# Patient Record
Sex: FEMALE | Race: WHITE
[De-identification: names, ages, dates, MRNs, and addresses within clinical notes are randomized per-mention and may not be internally consistent; named-entity substitution may affect disease eponyms.]

---

## 2019-01-13 ENCOUNTER — HOSPITAL ENCOUNTER (EMERGENCY)
Dept: HOSPITAL 63 - ER | Age: 49
Discharge: ANOTHER HOSPITAL | End: 2019-01-13
Payer: COMMERCIAL

## 2019-01-13 VITALS — HEIGHT: 62 IN | BODY MASS INDEX: 53.92 KG/M2 | WEIGHT: 293 LBS

## 2019-01-13 VITALS — SYSTOLIC BLOOD PRESSURE: 104 MMHG | DIASTOLIC BLOOD PRESSURE: 71 MMHG

## 2019-01-13 DIAGNOSIS — G89.11: ICD-10-CM
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
## 2019-01-13 DIAGNOSIS — R07.89: Primary | ICD-10-CM

## 2019-01-13 DIAGNOSIS — K21.9: ICD-10-CM
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
## 2019-01-13 DIAGNOSIS — G43.909: ICD-10-CM

## 2019-01-13 DIAGNOSIS — J45.909: ICD-10-CM

## 2019-01-13 DIAGNOSIS — R79.1: ICD-10-CM

## 2019-01-13 DIAGNOSIS — F41.9: ICD-10-CM
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
## 2019-01-13 DIAGNOSIS — E66.01: ICD-10-CM

## 2019-01-13 DIAGNOSIS — Z86.2: ICD-10-CM

## 2019-01-13 DIAGNOSIS — M19.90: ICD-10-CM
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
## 2019-01-13 DIAGNOSIS — M54.6: ICD-10-CM

## 2019-01-13 DIAGNOSIS — E46: ICD-10-CM

## 2019-01-13 LAB
ALBUMIN SERPL-MCNC: 3.1 G/DL (ref 3.4–5)
ALP SERPL-CCNC: 98 U/L (ref 46–116)
ALT SERPL-CCNC: 35 U/L (ref 14–59)
AMPHETAMINE/METHAMPHETAMINE: (no result)
ANION GAP SERPL CALC-SCNC: 10 MMOL/L (ref 6–14)
APTT PPP: YELLOW S
AST SERPL-CCNC: 32 U/L (ref 15–37)
BACTERIA #/AREA URNS HPF: 0 /HPF
BARBITURATES UR-MCNC: (no result) UG/ML
BASOPHILS # BLD AUTO: 0 X10^3/UL (ref 0–0.2)
BASOPHILS NFR BLD: 0 % (ref 0–3)
BENZODIAZ UR-MCNC: (no result) UG/L
BILIRUB DIRECT SERPL-MCNC: 0.4 MG/DL (ref 0–0.2)
BILIRUB SERPL-MCNC: 0.8 MG/DL (ref 0.2–1)
BILIRUB UR QL STRIP: (no result)
CA-I SERPL ISE-MCNC: 11 MG/DL (ref 7–20)
CALCIUM SERPL-MCNC: 8.9 MG/DL (ref 8.5–10.1)
CANNABINOIDS UR-MCNC: (no result) UG/L
CHLORIDE SERPL-SCNC: 95 MMOL/L (ref 98–107)
CO2 SERPL-SCNC: 27 MMOL/L (ref 21–32)
COCAINE UR-MCNC: (no result) NG/ML
CREAT SERPL-MCNC: 1 MG/DL (ref 0.6–1)
EOSINOPHIL NFR BLD: 0.1 X10^3/UL (ref 0–0.7)
EOSINOPHIL NFR BLD: 1 % (ref 0–3)
ERYTHROCYTE [DISTWIDTH] IN BLOOD BY AUTOMATED COUNT: 13.7 % (ref 11.5–14.5)
FIBRINOGEN PPP-MCNC: CLEAR MG/DL
GFR SERPLBLD BASED ON 1.73 SQ M-ARVRAT: 59.4 ML/MIN
GLUCOSE SERPL-MCNC: 103 MG/DL (ref 70–99)
GLUCOSE UR STRIP-MCNC: (no result) MG/DL
HCT VFR BLD CALC: 37.5 % (ref 36–47)
HGB BLD-MCNC: 12.6 G/DL (ref 12–15.5)
HYALINE CASTS #/AREA URNS LPF: (no result) /HPF
LIPASE: 56 U/L (ref 73–393)
LYMPHOCYTES # BLD: 0.6 X10^3/UL (ref 1–4.8)
LYMPHOCYTES NFR BLD AUTO: 7 % (ref 24–48)
MAGNESIUM SERPL-MCNC: 1.9 MG/DL (ref 1.8–2.4)
MCH RBC QN AUTO: 29 PG (ref 25–35)
MCHC RBC AUTO-ENTMCNC: 34 G/DL (ref 31–37)
MCV RBC AUTO: 87 FL (ref 79–100)
METHADONE SERPL-MCNC: (no result) NG/ML
MONO #: 0.7 X10^3/UL (ref 0–1.1)
MONOCYTES NFR BLD: 8 % (ref 0–9)
NEUT #: 7.1 X10^3UL (ref 1.8–7.7)
NEUTROPHILS NFR BLD AUTO: 84 % (ref 31–73)
NITRITE UR QL STRIP: (no result)
OPIATES UR-MCNC: (no result) NG/ML
PCP SERPL-MCNC: (no result) MG/DL
PLATELET # BLD AUTO: 238 X10^3/UL (ref 140–400)
POTASSIUM SERPL-SCNC: 3.8 MMOL/L (ref 3.5–5.1)
PROT SERPL-MCNC: 7.9 G/DL (ref 6.4–8.2)
RBC # BLD AUTO: 4.33 X10^6/UL (ref 3.5–5.4)
RBC #/AREA URNS HPF: (no result) /HPF (ref 0–2)
SODIUM SERPL-SCNC: 132 MMOL/L (ref 136–145)
SP GR UR STRIP: >=1.03
SQUAMOUS #/AREA URNS LPF: (no result) /LPF
UROBILINOGEN UR-MCNC: 2 MG/DL
WBC # BLD AUTO: 8.5 X10^3/UL (ref 4–11)
WBC #/AREA URNS HPF: (no result) /HPF (ref 0–4)

## 2019-01-13 PROCEDURE — 71045 X-RAY EXAM CHEST 1 VIEW: CPT

## 2019-01-13 PROCEDURE — 82550 ASSAY OF CK (CPK): CPT

## 2019-01-13 PROCEDURE — 85610 PROTHROMBIN TIME: CPT

## 2019-01-13 PROCEDURE — 36415 COLL VENOUS BLD VENIPUNCTURE: CPT

## 2019-01-13 PROCEDURE — 80048 BASIC METABOLIC PNL TOTAL CA: CPT

## 2019-01-13 PROCEDURE — 83690 ASSAY OF LIPASE: CPT

## 2019-01-13 PROCEDURE — 71275 CT ANGIOGRAPHY CHEST: CPT

## 2019-01-13 PROCEDURE — 85730 THROMBOPLASTIN TIME PARTIAL: CPT

## 2019-01-13 PROCEDURE — 80076 HEPATIC FUNCTION PANEL: CPT

## 2019-01-13 PROCEDURE — 84443 ASSAY THYROID STIM HORMONE: CPT

## 2019-01-13 PROCEDURE — 85379 FIBRIN DEGRADATION QUANT: CPT

## 2019-01-13 PROCEDURE — 84484 ASSAY OF TROPONIN QUANT: CPT

## 2019-01-13 PROCEDURE — 80307 DRUG TEST PRSMV CHEM ANLYZR: CPT

## 2019-01-13 PROCEDURE — 96372 THER/PROPH/DIAG INJ SC/IM: CPT

## 2019-01-13 PROCEDURE — 72128 CT CHEST SPINE W/O DYE: CPT

## 2019-01-13 PROCEDURE — 83735 ASSAY OF MAGNESIUM: CPT

## 2019-01-13 PROCEDURE — 99285 EMERGENCY DEPT VISIT HI MDM: CPT

## 2019-01-13 PROCEDURE — 83880 ASSAY OF NATRIURETIC PEPTIDE: CPT

## 2019-01-13 PROCEDURE — 85025 COMPLETE CBC W/AUTO DIFF WBC: CPT

## 2019-01-13 PROCEDURE — 93005 ELECTROCARDIOGRAM TRACING: CPT

## 2019-01-13 PROCEDURE — 81001 URINALYSIS AUTO W/SCOPE: CPT

## 2019-01-13 RX ADMIN — ENOXAPARIN SODIUM ONE MG: 150 INJECTION SUBCUTANEOUS at 22:24

## 2019-01-13 RX ADMIN — MORPHINE SULFATE ONE MG: 10 INJECTION, SOLUTION INTRAMUSCULAR; INTRAVENOUS at 20:57

## 2019-01-13 RX ADMIN — SODIUM CHLORIDE, SODIUM LACTATE, POTASSIUM CHLORIDE, AND CALCIUM CHLORIDE SCH MLS/HR: .6; .31; .03; .02 INJECTION, SOLUTION INTRAVENOUS at 20:57

## 2019-01-13 RX ADMIN — IOHEXOL ONE ML: 350 INJECTION, SOLUTION INTRAVENOUS at 20:28

## 2019-01-13 RX ADMIN — ASPIRIN 81 MG ONE MG: 81 TABLET ORAL at 20:57

## 2019-01-13 NOTE — RAD
CT THORACIC SPINE WO CONTRAST, CT ANGIOGRAPHY CHEST dated 1/13/2019 8:20 

PM

 

Indication:UPPER BACK PAIN- INBETWEEN SHOULDER BLADES, FALL<BR>HX 

COMPRESSION FRACTURE - UNKNOWN LEVEL

 

Comparison: CT chest 9/20/2012

 

Technique: Thin section CT images were performed using an infusion of 100 

mL Omnipaque 350. MIP reconstructions were obtained.

One or more of the following individualized dose reduction techniques were

utilized for this examination:  1. Automated exposure control  2. 

Adjustment of the mA and/or kV according to patient size  3. Use of 

iterative reconstruction technique

 

Findings: 

There is mild atelectasis in the lungs. They otherwise appear clear. The 

central airways show no obstruction. No enlarged lymph nodes are seen. The

thoracic aorta is normal in caliber without evidence of dissection.

 

Evaluation of the pulmonary arterial tree is mildly affected by 

respiratory motion artifact. Contrast opacification is moderate. No 

abnormal filling defect is seen extending to the subsegmental branch 

level.

 

Images through the upper abdomen show some gallbladder distention. No 

other abnormality is seen.

 

CT thoracic spine: Alignment is normal. There is no loss of vertebral body

height or other evidence for fracture. No destructive process is seen. 

There are prominent anterior and lateral osteophytes through the mid 

thoracic levels. Evaluation of the soft tissue components of the canal is 

limited without intrathecal contrast. There is a chronic appearing 

superior endplate defect of L1.   

 

IMPRESSION:

No evidence of pulmonary embolism. Mild atelectasis in the lungs. No other

acute findings.

 

CT thoracic spine shows degenerative changes without apparent acute 

abnormality.

 

Electronically signed by: Quirino Norris Jr., MD (1/13/2019 9:17 PM) 

[facility]

## 2019-01-13 NOTE — ED.ADGEN
Past History


Past Medical History:  Anemia, Anxiety, Arthritis, Asthma, Cancer, Constipation

, GERD, Migraines, Other


Additional Past Medical Histor:  Morbid Obesity  BMI 50. 0 - 59.9,  Hx. 

Atypical CP,  Chest wall pain,


Past Medical History


Compression fx- end plate T 12


Past Surgical History:  No Surgical History


Alcohol Use:  None


Drug Use:  None





Adult General


Chief Complaint


Chief Complaint


".. I fell .. and hurt this Rt shoulder and back...   I was on the way to 

bathroom.. I was using my Dads walker.. I tripped and fell.. I still hurt..I 

took all the left over pain meds that I ve had.... and I still hurt.... ""  ..I 

was recently at [facility]  for two days... they did nothing for me.... ". " I 

fell on Sat.  in the morning.. "





HPI


HPI





Patient is a 48 year old female who presents with above hx and complaints chest 

wall and back pain.  Pt. is somewhat uncooperative  poor historian after taken 

multiple pain meds and muscle relaxers at home prior to arrival.   Pt. recently 

admitted at [facility] for atypical chest and back pain.   Pt. admitted on 1/11/ 2019  and discharged on per her hx today???.  Pt. normally follows with Dr. Potts.   Will attempt to get records from [facility].  Pt. currently reports back 

and chest pain as 20/10.   States any movement is pain.  Pt .  pain appears to 

be Thoracic and Rt. Posterior Chest wall and Shoulder blade.





Review of Systems


Review of Systems





Constitutional: Denies fever or chills []


Eyes: Denies change in visual acuity, redness, or eye pain []


HENT: Denies nasal congestion or sore throat []


Respiratory: Denies cough or shortness of breath []Complaints of Lt chest wall 

pain. 


Cardiovascular: No additional information not addressed in HPI []


GI: Denies abdominal pain, nausea, vomiting, bloody stools or diarrhea []


: Denies dysuria or hematuria []


Musculoskeletal: Complains of thoracic  back pain or joint pain []Complaints of 

Lt scapular pain.  Complaints of generalized weakness because of pain.


Integument: Denies rash or skin lesions []


Neurologic: Denies headache, focal weakness or sensory changes []


Endocrine: Denies polyuria or polydipsia []





All other systems were reviewed and found to be within normal limits, except as 

documented in this note.





Family History


Family History


No contributory





Current Medications


Current Medications





Current Medications








 Medications


  (Trade)  Dose


 Ordered  Sig/Scar  Start Time


 Stop Time Status Last Admin


Dose Admin


 


 Aspirin


  (Children'S


 Aspirin)  324 mg  1X  ONCE  1/13/19 20:15


 1/13/19 20:16 DC 1/13/19 20:57


324 MG


 


 Enoxaparin Sodium


  (Lovenox 150mg


 Syringe)  150 mg  ONCE  ONCE  1/13/19 22:15


 1/13/19 22:17 DC 1/13/19 22:24


150 MG


 


 Info


  (Do NOT chart on


 this entry -- for


 MONITORING)  1 each  PRN DAILY  PRN  1/13/19 20:15


 1/13/19 23:33 DC  





 


 Iohexol


  (Omnipaque 350


 Mg/ml)  100 ml  1X  ONCE  1/13/19 20:00


 1/13/19 20:01 DC 1/13/19 20:28


100 ML


 


 Lactated Ringer's  1,000 ml @ 


 100 mls/hr  Q10H  1/13/19 19:42


 1/13/19 23:33 DC 1/13/19 20:57


100 MLS/HR


 


 Morphine Sulfate


  (Morphine 10mg


 Syringe)  10 mg  1X  ONCE  1/13/19 20:15


 1/13/19 20:16 DC 1/13/19 20:57


10 MG











Allergies


Allergies





Allergies








Coded Allergies Type Severity Reaction Last Updated Verified


 


  No Known Drug Allergies    1/13/19 No











Physical Exam


Physical Exam





Constitutional: , no acute distress,  appearance. of over sedation.   When 

stimulated her pain is 100/10. Pt. does fall back to sleep . 


HENT: Normocephalic, atraumatic, bilateral external ears normal, oropharynx 

moist, no oral exudates, nose normal. []


Eyes: PERRLA, EOMI, conjunctiva normal, no discharge. [] 


Neck: Normal range of motion, no tenderness, supple, no stridor. [] 


Cardiovascular:Tachycardia Heart rate regular rhythm, no murmur [PMI to Lt. ]


Lungs & Thorax:  Bilateral breath sounds equal at apexes, with bibasilar 

crackles on auscultation []


Abdomen: Bowel sounds normal, soft, no tenderness, no masses, no pulsatile 

masses. Obese. 


Skin: Warm, dry, no erythema, no rash. [] 


Back: Mid Thoracic and Rt. shoulder  tenderness, no CVA tenderness. [] 


Extremities: No tenderness, no cyanosis, no clubbing, ROM intact, bilateral 

ankle edema. [] 


Neurologic: Alert and oriented X 3, but appears very sedated, moves ext. on 

request,   no gross focal deficits noted. []DTRs +2 patella and brachial.


Psychologic: Affect anxious, appears over sedated., mood depressed





Current Patient Data


Vital Signs





 Vital Signs








  Date Time  Temp Pulse Resp B/P (MAP) Pulse Ox O2 Delivery O2 Flow Rate FiO2


 


1/13/19 21:44  104 18 104/71 (82) 93 Room Air  


 


1/13/19 19:29 99.8       








Lab Results





 Laboratory Tests








Test


 1/13/19


20:15


 


White Blood Count


 8.5 x10^3/uL


(4.0-11.0)


 


Red Blood Count


 4.33 x10^6/uL


(3.50-5.40)


 


Hemoglobin


 12.6 g/dL


(12.0-15.5)


 


Hematocrit


 37.5 %


(36.0-47.0)


 


Mean Corpuscular Volume


 87 fL ()





 


Mean Corpuscular Hemoglobin 29 pg (25-35)  


 


Mean Corpuscular Hemoglobin


Concent 34 g/dL


(31-37)


 


Red Cell Distribution Width


 13.7 %


(11.5-14.5)


 


Platelet Count


 238 x10^3/uL


(140-400)


 


Neutrophils (%) (Auto) 84 % (31-73)  H


 


Lymphocytes (%) (Auto) 7 % (24-48)  L


 


Monocytes (%) (Auto) 8 % (0-9)  


 


Eosinophils (%) (Auto) 1 % (0-3)  


 


Basophils (%) (Auto) 0 % (0-3)  


 


Neutrophils # (Auto)


 7.1 x10^3uL


(1.8-7.7)


 


Lymphocytes # (Auto)


 0.6 x10^3/uL


(1.0-4.8)  L


 


Monocytes # (Auto)


 0.7 x10^3/uL


(0.0-1.1)


 


Eosinophils # (Auto)


 0.1 x10^3/uL


(0.0-0.7)


 


Basophils # (Auto)


 0.0 x10^3/uL


(0.0-0.2)


 


Prothrombin Time


 10.5 SEC


(9.4-11.4)


 


Prothrombin Time INR 1.1 (0.9-1.1)  


 


PTT


 30 SEC (23-33)





 


D-Dimer (Melissa)


 2.37 mg/L


(0.00-0.50)  H


 


Urine Collection Type Unknown  


 


Urine Color Yellow  


 


Urine Clarity Clear  


 


Urine pH 6.0  


 


Urine Specific Gravity >=1.030  


 


Urine Protein


 30 mg/dl


(NEG-TRACE)


 


Urine Glucose (UA)


 Neg mg/dL


(NEG)


 


Urine Ketones (Stick)


 15 mg/dL (NEG)





 


Urine Blood Neg (NEG)  


 


Urine Nitrite Neg (NEG)  


 


Urine Bilirubin Neg (NEG)  


 


Urine Urobilinogen Dipstick


 2 mg/dL (0.2


mg/dL)


 


Urine Leukocyte Esterase Neg (NEG)  


 


Urine RBC


 Occ /HPF (0-2)





 


Urine WBC


 1-4 /HPF (0-4)





 


Urine Squamous Epithelial


Cells Mod /LPF  





 


Urine Bacteria


 0 /HPF (0-FEW)





 


Urine Hyaline Casts Occ /HPF  


 


Urine Mucus Mod /LPF  


 


Sodium Level


 132 mmol/L


(136-145)  L


 


Potassium Level


 3.8 mmol/L


(3.5-5.1)


 


Chloride Level


 95 mmol/L


()  L


 


Carbon Dioxide Level


 27 mmol/L


(21-32)


 


Anion Gap 10 (6-14)  


 


Blood Urea Nitrogen


 11 mg/dL


(7-20)


 


Creatinine


 1.0 mg/dL


(0.6-1.0)


 


Estimated GFR


(Cockcroft-Gault) 59.4  





 


Glucose Level


 103 mg/dL


(70-99)  H


 


Calcium Level


 8.9 mg/dL


(8.5-10.1)


 


Magnesium Level


 1.9 mg/dL


(1.8-2.4)


 


Total Bilirubin


 0.8 mg/dL


(0.2-1.0)


 


Direct Bilirubin


 0.4 mg/dL


(0.0-0.2)  H


 


Aspartate Amino Transferase


(AST) 32 U/L (15-37)





 


Alanine Aminotransferase (ALT)


 35 U/L (14-59)





 


Alkaline Phosphatase


 98 U/L


()


 


Creatine Kinase


 264 U/L


()  H


 


Troponin I Quantitative


 < 0.017 ng/mL


(0-0.055)


 


NT-Pro-B-Type Natriuretic


Peptide 341 pg/mL


(0-124)  H


 


Total Protein


 7.9 g/dL


(6.4-8.2)


 


Albumin


 3.1 g/dL


(3.4-5.0)  L


 


Lipase


 56 U/L


()  L


 


Urine Opiates Screen Pos (NEG)  


 


Urine Methadone Screen Neg (NEG)  


 


Urine Barbiturates Neg (NEG)  


 


Urine Phencyclidine Screen Neg (NEG)  


 


Urine


Amphetamine/Methamphetamine Neg (NEG)  





 


Urine Benzodiazepines Screen Neg (NEG)  


 


Urine Cocaine Screen Neg (NEG)  


 


Urine Cannabinoids Screen Neg (NEG)  


 


Urine Ethyl Alcohol Neg (NEG)  











EKG


EKG


My interpretation of EKG shows a sinus tachycardia 110 bpm. There is some 

nonspecific anterior septal changes but no findings acute STEMI with 

contralateral changes.[]





Radiology/Procedures


Radiology/Procedures


My interpretation of chest x-ray shows cardiomegaly. There does appear to be 

some linear atelectasis.  CT shows no significant vertebral body loss or 

displaced fracture. Osteophytes, appears to have. In plate defect no one or T 

12. No findings of PE. Does have bilateral atelectasis of lungs,  chronic 

degenerative joint changes.[]  See formal report when available.





Course & Med Decision Making


Course & Med Decision Making


Pertinent Labs and Imaging studies reviewed. (See chart for details)





Still awaiting records - [facility]. 





Pt. unable to stand or be ambulatory after meds.  





Discussed presentation, testing and tx plan with Dr. Jeremie- will transfer to 

[facility] for further eval., possible orthro. consult, MRI.    Pt. may need US to 

rule out DVT.    No obvious PE on current CT.  Formal report pending. 





[]





Final Impression


Final Impression


1. Hx.  Fall[] Sat. 09/18


2. Atypical Chest Wall Pain


3. Thoracic Spine - Hx. T 12- L1 end plate compression fx.  


4. Elevated D-dimer  2.32


5. Morbid Obesity


6. Over Sedation


7. Inability to Stand- and Ambulate due to back and chest wall pain


8. Malnutrition  Alb. 3.1


9. Marked Deconditioning





Dragon Disclaimer


Dragon Disclaimer


This electronic medical record was generated, in whole or in part, using a 

voice recognition dictation system.





Dragon Disclaimer


This chart was dictated in whole or in part using Voice Recognition software in 

a busy, high-work load, and often noisy Emergency Department environment.  It 

may contain unintended and wholly unrecognized errors or omissions.





Discharge Summary


Visit Information


Final Diagnosis


Problems


Medical Problems:


(1) Back pain


Status: Acute  











Brief Hospital Course


Allergies





 Allergies








Coded Allergies Type Severity Reaction Last Updated Verified


 


  No Known Drug Allergies    1/13/19 No








Vital Signs





Vital Signs








  Date Time  Temp Pulse Resp B/P (MAP) Pulse Ox O2 Delivery O2 Flow Rate FiO2


 


1/13/19 21:44  104 18 104/71 (82) 93 Room Air  


 


1/13/19 19:29 99.8       








Lab Results





Laboratory Tests








Test


 1/13/19


20:15


 


White Blood Count


 8.5 x10^3/uL


(4.0-11.0)


 


Red Blood Count


 4.33 x10^6/uL


(3.50-5.40)


 


Hemoglobin


 12.6 g/dL


(12.0-15.5)


 


Hematocrit


 37.5 %


(36.0-47.0)


 


Mean Corpuscular Volume 87 fL () 


 


Mean Corpuscular Hemoglobin 29 pg (25-35) 


 


Mean Corpuscular Hemoglobin


Concent 34 g/dL


(31-37)


 


Red Cell Distribution Width


 13.7 %


(11.5-14.5)


 


Platelet Count


 238 x10^3/uL


(140-400)


 


Neutrophils (%) (Auto) 84 % (31-73) 


 


Lymphocytes (%) (Auto) 7 % (24-48) 


 


Monocytes (%) (Auto) 8 % (0-9) 


 


Eosinophils (%) (Auto) 1 % (0-3) 


 


Basophils (%) (Auto) 0 % (0-3) 


 


Neutrophils # (Auto)


 7.1 x10^3uL


(1.8-7.7)


 


Lymphocytes # (Auto)


 0.6 x10^3/uL


(1.0-4.8)


 


Monocytes # (Auto)


 0.7 x10^3/uL


(0.0-1.1)


 


Eosinophils # (Auto)


 0.1 x10^3/uL


(0.0-0.7)


 


Basophils # (Auto)


 0.0 x10^3/uL


(0.0-0.2)


 


Prothrombin Time


 10.5 SEC


(9.4-11.4)


 


Prothromb Time International


Ratio 1.1 (0.9-1.1) 





 


Activated Partial


Thromboplast Time 30 SEC (23-33) 





 


D-Dimer (Melissa)


 2.37 mg/L


(0.00-0.50)


 


Urine Collection Type Unknown 


 


Urine Color Yellow 


 


Urine Clarity Clear 


 


Urine pH 6.0 


 


Urine Specific Gravity >=1.030 


 


Urine Protein


 30 mg/dl


(NEG-TRACE)


 


Urine Glucose (UA)


 Neg mg/dL


(NEG)


 


Urine Ketones (Stick) 15 mg/dL (NEG) 


 


Urine Blood Neg (NEG) 


 


Urine Nitrite Neg (NEG) 


 


Urine Bilirubin Neg (NEG) 


 


Urine Urobilinogen Dipstick


 2 mg/dL (0.2


mg/dL)


 


Urine Leukocyte Esterase Neg (NEG) 


 


Urine RBC Occ /HPF (0-2) 


 


Urine WBC 1-4 /HPF (0-4) 


 


Urine Squamous Epithelial


Cells Mod /LPF 





 


Urine Bacteria 0 /HPF (0-FEW) 


 


Urine Hyaline Casts Occ /HPF 


 


Urine Mucus Mod /LPF 


 


Sodium Level


 132 mmol/L


(136-145)


 


Potassium Level


 3.8 mmol/L


(3.5-5.1)


 


Chloride Level


 95 mmol/L


()


 


Carbon Dioxide Level


 27 mmol/L


(21-32)


 


Anion Gap 10 (6-14) 


 


Blood Urea Nitrogen


 11 mg/dL


(7-20)


 


Creatinine


 1.0 mg/dL


(0.6-1.0)


 


Estimated GFR


(Cockcroft-Gault) 59.4 





 


Glucose Level


 103 mg/dL


(70-99)


 


Calcium Level


 8.9 mg/dL


(8.5-10.1)


 


Magnesium Level


 1.9 mg/dL


(1.8-2.4)


 


Total Bilirubin


 0.8 mg/dL


(0.2-1.0)


 


Direct Bilirubin


 0.4 mg/dL


(0.0-0.2)


 


Aspartate Amino Transf


(AST/SGOT) 32 U/L (15-37) 





 


Alanine Aminotransferase


(ALT/SGPT) 35 U/L (14-59) 





 


Alkaline Phosphatase


 98 U/L


()


 


Creatine Kinase


 264 U/L


()


 


Troponin I Quantitative


 < 0.017 ng/mL


(0-0.055)


 


NT-Pro-B-Type Natriuretic


Peptide 341 pg/mL


(0-124)


 


Total Protein


 7.9 g/dL


(6.4-8.2)


 


Albumin


 3.1 g/dL


(3.4-5.0)


 


Lipase


 56 U/L


()


 


Urine Opiates Screen Pos (NEG) 


 


Urine Methadone Screen Neg (NEG) 


 


Urine Barbiturates Neg (NEG) 


 


Urine Phencyclidine Screen Neg (NEG) 


 


Urine


Amphetamine/Methamphetamine Neg (NEG) 





 


Urine Benzodiazepines Screen Neg (NEG) 


 


Urine Cocaine Screen Neg (NEG) 


 


Urine Cannabinoids Screen Neg (NEG) 


 


Urine Ethyl Alcohol Neg (NEG) 








Brief Hospital Course


Ms. Khan  is a 48 old female who presented with atypical chest pain and mid 

back pain.  Has end plate compression Fx L1/T12.   Inability to ambulate 

because of pain- transfer to [facility] for further eval.  Possible MRI.   Transfer to 

Dr. Chao.





Discharge Information


Condition at Discharge:  Stable


Disposition/Orders:  D/C to Another Facility


Dischare Medications





Current Medications


Aspirin (Children'S Aspirin) 324 mg 1X  ONCE PO  Last administered on 1/13/19at 

20:57; Admin Dose 324 MG;  Start 1/13/19 at 20:15;  Stop 1/13/19 at 20:16;  

Status DC


Lactated Ringer's 1,000 ml @  100 mls/hr Q10H IV  Last administered on 1/13/ 19at 20:57; Admin Dose 100 MLS/HR;  Start 1/13/19 at 19:42;  Stop 1/13/19 at 23:

33;  Status DC


Morphine Sulfate (Morphine 10mg Syringe) 10 mg 1X  ONCE SQ  Last administered 

on 1/13/19at 20:57; Admin Dose 10 MG;  Start 1/13/19 at 20:15;  Stop 1/13/19 at 

20:16;  Status DC


Iohexol (Omnipaque 350 Mg/ml) 100 ml 1X  ONCE IV  Last administered on 1/13/ 19at 20:28; Admin Dose 100 ML;  Start 1/13/19 at 20:00;  Stop 1/13/19 at 20:01;

  Status DC


Info (Do NOT chart on this entry -- for MONITORING) 1 each PRN DAILY  PRN MC 

SEE COMMENTS;  Start 1/13/19 at 20:15;  Stop 1/13/19 at 23:33;  Status DC


Enoxaparin Sodium (Lovenox 150mg Syringe) 150 mg ONCE  ONCE SQ  Last 

administered on 1/13/19at 22:24; Admin Dose 150 MG;  Start 1/13/19 at 22:15;  

Stop 1/13/19 at 22:17;  Status DC





Active Scripts


Active


Reported


No Known Medications Prior To Admisstion (Info)  Each 1 Each  














CARLEY PEOPLES MD Jan 13, 2019 19:32

## 2019-01-13 NOTE — RAD
CT THORACIC SPINE WO CONTRAST, CT ANGIOGRAPHY CHEST dated 1/13/2019 8:20 

PM

 

Indication:UPPER BACK PAIN- INBETWEEN SHOULDER BLADES, FALL<BR>HX 

COMPRESSION FRACTURE - UNKNOWN LEVEL

 

Comparison: CT chest 9/20/2012

 

Technique: Thin section CT images were performed using an infusion of 100 

mL Omnipaque 350. MIP reconstructions were obtained.

One or more of the following individualized dose reduction techniques were

utilized for this examination:  1. Automated exposure control  2. 

Adjustment of the mA and/or kV according to patient size  3. Use of 

iterative reconstruction technique

 

Findings: 

There is mild atelectasis in the lungs. They otherwise appear clear. The 

central airways show no obstruction. No enlarged lymph nodes are seen. The

thoracic aorta is normal in caliber without evidence of dissection.

 

Evaluation of the pulmonary arterial tree is mildly affected by 

respiratory motion artifact. Contrast opacification is moderate. No 

abnormal filling defect is seen extending to the subsegmental branch 

level.

 

Images through the upper abdomen show some gallbladder distention. No 

other abnormality is seen.

 

CT thoracic spine: Alignment is normal. There is no loss of vertebral body

height or other evidence for fracture. No destructive process is seen. 

There are prominent anterior and lateral osteophytes through the mid 

thoracic levels. Evaluation of the soft tissue components of the canal is 

limited without intrathecal contrast. There is a chronic appearing 

superior endplate defect of L1.   

 

IMPRESSION:

No evidence of pulmonary embolism. Mild atelectasis in the lungs. No other

acute findings.

 

CT thoracic spine shows degenerative changes without apparent acute 

abnormality.

 

Electronically signed by: Quirino Norris Jr., MD (1/13/2019 9:17 PM) 

[facility]

## 2019-01-13 NOTE — RAD
AP portable chest 1/13/2019.

 

Reason for exam: Upper back and chest pain.

 

No infiltrate or effusion is seen. Heart size and pulmonary vascularity 

appear grossly normal.

 

IMPRESSION: No acute findings.

 

Electronically signed by: Quirino Norris Jr., MD (1/13/2019 9:18 PM) 

[facility]

## 2019-01-14 NOTE — EKG
[facility] [address], [place] [address]

Test Date:    2019               Test Time:    19:48:32

Pat Name:     LASHONDA SANABRIA          Department:   

Patient ID:   [id number]           Room:          

Gender:       F                        Technician:   CHELA

:          1970               Requested By: CARLEY PEOPLES

Order Number: [id number]            Reading MD:   John Jara MD

                                 Measurements

Intervals                              Axis          

Rate:         110                      P:            

KY:                                    QRS:          18

QRSD:         90                       T:            20

QT:           346                                    

QTc:          474                                    

                           Interpretive Statements

SINUS TACHYCARDIA



Electronically Signed On 1- 11:10:21 CST by John Jara MD

## 2019-03-22 ENCOUNTER — HOSPITAL ENCOUNTER (OUTPATIENT)
Dept: HOSPITAL 61 - SURG | Age: 49
Discharge: HOME | End: 2019-03-22
Payer: COMMERCIAL

## 2019-03-22 VITALS — DIASTOLIC BLOOD PRESSURE: 66 MMHG | SYSTOLIC BLOOD PRESSURE: 126 MMHG

## 2019-03-22 DIAGNOSIS — M25.462: Primary | ICD-10-CM

## 2019-03-22 DIAGNOSIS — M71.22: ICD-10-CM

## 2019-03-22 PROCEDURE — 73721 MRI JNT OF LWR EXTRE W/O DYE: CPT

## 2019-03-22 NOTE — RAD
EXAM: MRI LEFT KNEE

 

DATE: 3/22/2019 1:00 PM

 

CLINICAL INDICATION: Progressive medial left knee pain, chronic. 

Associated left knee instability.

 

COMPARISON: None.

 

TECHNIQUE: Multiplanar, multisequence MRI of the left knee was performed 

without contrast.

 

FINDINGS:

Exam is markedly limited given marked motion artifact despite patient 

sedation. Within these constraints:

 

Small left knee joint effusion. Small Baker's cyst.

 

The ACL and PCL are grossly intact.

 

In general, the MCL, fibular collateral ligament, biceps femoris and IT 

band are intact. Mild lateral patellar tracking. Trochlear dysplasia. 

Extensor mechanism is intact.

 

The menisci cannot be accurately assessed given marked motion artifact.

 

 

IMPRESSION: 

1.  The exam is markedly limited given extensive motion artifact despite 

patient sedation.

2.  Menisci cannot be accurately assessed.

3.  Small left knee joint effusion. Small Baker's cyst.

4.  MCL is grossly intact.

5.  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

 

Electronically signed by: Michael Khanna MD (3/22/2019 3:04 PM) [id number]

## 2020-05-12 ENCOUNTER — HOSPITAL ENCOUNTER (EMERGENCY)
Dept: HOSPITAL 63 - ER | Age: 50
Discharge: HOME | End: 2020-05-12
Payer: COMMERCIAL

## 2020-05-12 VITALS — WEIGHT: 293 LBS | HEIGHT: 62 IN | BODY MASS INDEX: 53.92 KG/M2

## 2020-05-12 VITALS — SYSTOLIC BLOOD PRESSURE: 124 MMHG | DIASTOLIC BLOOD PRESSURE: 78 MMHG

## 2020-05-12 DIAGNOSIS — Z86.2: ICD-10-CM

## 2020-05-12 DIAGNOSIS — Z20.828: ICD-10-CM

## 2020-05-12 DIAGNOSIS — M19.90: ICD-10-CM

## 2020-05-12 DIAGNOSIS — E66.01: ICD-10-CM

## 2020-05-12 DIAGNOSIS — R07.89: Primary | ICD-10-CM

## 2020-05-12 DIAGNOSIS — R05: ICD-10-CM

## 2020-05-12 DIAGNOSIS — G43.909: ICD-10-CM

## 2020-05-12 DIAGNOSIS — K21.9: ICD-10-CM

## 2020-05-12 DIAGNOSIS — J45.909: ICD-10-CM

## 2020-05-12 LAB
ALBUMIN SERPL-MCNC: 3.3 G/DL (ref 3.4–5)
ALBUMIN/GLOB SERPL: 0.8 {RATIO} (ref 1–1.7)
ALP SERPL-CCNC: 109 U/L (ref 46–116)
ALT SERPL-CCNC: 28 U/L (ref 14–59)
ANION GAP SERPL CALC-SCNC: 7 MMOL/L (ref 6–14)
AST SERPL-CCNC: 15 U/L (ref 15–37)
BASOPHILS # BLD AUTO: 0 X10^3/UL (ref 0–0.2)
BASOPHILS NFR BLD: 1 % (ref 0–3)
BILIRUB SERPL-MCNC: 0.3 MG/DL (ref 0.2–1)
BUN/CREAT SERPL: 19 (ref 6–20)
CA-I SERPL ISE-MCNC: 15 MG/DL (ref 7–20)
CALCIUM SERPL-MCNC: 9.1 MG/DL (ref 8.5–10.1)
CHLORIDE SERPL-SCNC: 104 MMOL/L (ref 98–107)
CO2 SERPL-SCNC: 28 MMOL/L (ref 21–32)
CREAT SERPL-MCNC: 0.8 MG/DL (ref 0.6–1)
EOSINOPHIL NFR BLD: 0.1 X10^3/UL (ref 0–0.7)
EOSINOPHIL NFR BLD: 3 % (ref 0–3)
ERYTHROCYTE [DISTWIDTH] IN BLOOD BY AUTOMATED COUNT: 15 % (ref 11.5–14.5)
GFR SERPLBLD BASED ON 1.73 SQ M-ARVRAT: 76.2 ML/MIN
GLOBULIN SER-MCNC: 3.9 G/DL (ref 2.2–3.8)
GLUCOSE SERPL-MCNC: 98 MG/DL (ref 70–99)
HCT VFR BLD CALC: 40.2 % (ref 36–47)
HGB BLD-MCNC: 12.9 G/DL (ref 12–15.5)
LIPASE: 58 U/L (ref 73–393)
LYMPHOCYTES # BLD: 1.9 X10^3/UL (ref 1–4.8)
LYMPHOCYTES NFR BLD AUTO: 34 % (ref 24–48)
MAGNESIUM SERPL-MCNC: 2.1 MG/DL (ref 1.8–2.4)
MCH RBC QN AUTO: 28 PG (ref 25–35)
MCHC RBC AUTO-ENTMCNC: 32 G/DL (ref 31–37)
MCV RBC AUTO: 87 FL (ref 79–100)
MONO #: 0.4 X10^3/UL (ref 0–1.1)
MONOCYTES NFR BLD: 8 % (ref 0–9)
NEUT #: 2.9 X10^3UL (ref 1.8–7.7)
NEUTROPHILS NFR BLD AUTO: 54 % (ref 31–73)
PLATELET # BLD AUTO: 302 X10^3/UL (ref 140–400)
POTASSIUM SERPL-SCNC: 4 MMOL/L (ref 3.5–5.1)
PROT SERPL-MCNC: 7.2 G/DL (ref 6.4–8.2)
RBC # BLD AUTO: 4.6 X10^6/UL (ref 3.5–5.4)
SODIUM SERPL-SCNC: 139 MMOL/L (ref 136–145)
WBC # BLD AUTO: 5.4 X10^3/UL (ref 4–11)

## 2020-05-12 PROCEDURE — 85025 COMPLETE CBC W/AUTO DIFF WBC: CPT

## 2020-05-12 PROCEDURE — 36415 COLL VENOUS BLD VENIPUNCTURE: CPT

## 2020-05-12 PROCEDURE — 71045 X-RAY EXAM CHEST 1 VIEW: CPT

## 2020-05-12 PROCEDURE — 80053 COMPREHEN METABOLIC PANEL: CPT

## 2020-05-12 PROCEDURE — 85730 THROMBOPLASTIN TIME PARTIAL: CPT

## 2020-05-12 PROCEDURE — 87635 SARS-COV-2 COVID-19 AMP PRB: CPT

## 2020-05-12 PROCEDURE — 93005 ELECTROCARDIOGRAM TRACING: CPT

## 2020-05-12 PROCEDURE — 83690 ASSAY OF LIPASE: CPT

## 2020-05-12 PROCEDURE — 83735 ASSAY OF MAGNESIUM: CPT

## 2020-05-12 PROCEDURE — 85379 FIBRIN DEGRADATION QUANT: CPT

## 2020-05-12 PROCEDURE — 84484 ASSAY OF TROPONIN QUANT: CPT

## 2020-05-12 PROCEDURE — 85610 PROTHROMBIN TIME: CPT

## 2020-05-12 PROCEDURE — 83880 ASSAY OF NATRIURETIC PEPTIDE: CPT

## 2020-05-12 PROCEDURE — 99285 EMERGENCY DEPT VISIT HI MDM: CPT

## 2020-05-12 NOTE — RAD
AP view of the chest. 

 

Comparison:  Chest radiograph and CT dated 1/13/2019.

 

Indication:  Chest pain

 

Findings: The patient is slightly rotated to the right.

 

Normal lung volume. Mild bilateral interstitial opacities are similar to 

prior. No focal airspace disease. Unchanged pulmonary vascular fullness. 

No pleural effusion. No pneumothorax. The cardiomediastinal silhouette is 

normal in appearance. The great vessels are normal. No acute osseous 

abnormality.

 

Impression: 

Mild bilateral interstitial opacities are similar prior. No new 

consolidation. Unchanged pulmonary vascular fullness.

 

Electronically signed by: Sal Ortiz MD (5/12/2020 1:57 PM) 

[id number]

## 2020-05-12 NOTE — PHYS DOC
Past History


Past Medical History:  Anemia, Anxiety, Arthritis, Asthma, Cancer, Constipation,

GERD, Migraines, Other


Additional Past Medical Histor:  Morbid Obesity  BMI 50. 0 - 59.9,  Hx. Atypical

CP,  Chest wall pain,


Past Surgical History:  No Surgical History


Smoking:  Non-smoker


Alcohol Use:  None


Drug Use:  None





General Adult


EDM:


Chief Complaint:  CHEST PAIN





HPI:


HPI:


Patient is a 50-year-old female who presents to the emergency department for 

evaluation of anterior chest pain which has been present for the past 10 days.  

She states the pain is present with physical exertion, and associated with some 

shortness of breath.  She states the pain is precipitated by coughing.  She has 

had a cough for the past 10 days, mostly nonproductive.  She has also had some 

myalgias.  She denies any pleuritic pain, but does report  a sensation of  

tachycardia.  She has not had any fevers.  Symptoms have been present for the 

past 10 days.  She was put on a Z-Alpesh for her cough but her symptoms did not 

improve.  There are no other alleviating or exacerbating factors to her 

symptoms.





Review of Systems:


Review of Systems:


Constitutional:  Denies fever or chills 


Eyes:  Denies change in visual acuity 


HENT:  Denies nasal congestion or sore throat 


Respiratory: As per HPI


Cardiovascular: As per HPI


GI:  Denies abdominal pain, nausea, vomiting, bloody stools or diarrhea 


:  Denies dysuria 


Musculoskeletal:  Denies back pain or joint pain 


Integument:  Denies rash 


Neurologic:  Denies headache, focal weakness or sensory changes 


Endocrine:  Denies polyuria or polydipsia 


Lymphatic:  Denies swollen glands 


Psychiatric:  Denies depression or anxiety





Heart Score:


HEART Score for Chest Pain:  








HEART Score for Chest Pain Response (Comments) Value


 


History Slighlty/Non-Suspicious 0


 


ECG Nonspecific Repolarizatio 1


 


Age >45 - < 65 1


 


Risk Factors                            1 or 2 Risk Factors 1


 


Troponin < Normal Limit 0


 


Total  3








Risk Factors:


Risk Factors:  DM, Current or recent (<one month) smoker, HTN, HLP, family 

history of CAD, obesity.


Risk Scores:


Score 0 - 3:  2.5% MACE over next 6 weeks - Discharge Home


Score 4 - 6:  20.3% MACE over next 6 weeks - Admit for Clinical Observation


Score 7 - 10:  72.7% MACE over next 6 weeks - Early Invasive Strategies





Allergies:


Allergies:





Allergies








Coded Allergies Type Severity Reaction Last Updated Verified


 


  No Known Drug Allergies    1/13/19 No











Physical Exam:


PE:


PHYSICAL EXAM:





CONSTITUTIONAL: Well developed, well nourished


HEAD: normocephalic, atraumatic


EENT: PERRL, EOMI. Conjunctivae normal color, sclerae non-icteric; moist mucous 

membranes.


NECK: Supple, non-tender; no meningismus.


LUNGS: Lungs CTA, breathing even and unlabored. Normal air movement.  A dry 

cough is present.


HEART: Regular rate and rhythm, no murmur


CHEST: No deformity; palpation of the anterior chest wall reproduces the 

patient's pain.


ABDOMEN: The abdomen is soft, and non-tender, no masses or bruits.


EXTREM: Normal ROM; no deformity, no calf tenderness. Normal pulses palpable in 

all extremities. There is no pedal edema.


SKIN: No rash; no diaphoresis


NEURO: Alert; normal speech and cognition; CN's grossly intact; strength grossly

intact without focal deficit.


BACK: No CVA TTP.





EKG:


EKG:


Normal sinus rhythm at a rate of 76 bpm, normal axis, normal intervals, 

nonspecific ST/T changes are present in the lateral limb leads.  There are no 

acute ischemic changes noted.  []





Radiology/Procedures:


Radiology/Procedures:


PROCEDURE: PORTABLE CHEST 1V





AP view of the chest. 


 


Comparison:  Chest radiograph and CT dated 1/13/2019.


 


Indication:  Chest pain


 


Findings: The patient is slightly rotated to the right.


 


Normal lung volume. Mild bilateral interstitial opacities are similar to 


prior. No focal airspace disease. Unchanged pulmonary vascular fullness. 


No pleural effusion. No pneumothorax. The cardiomediastinal silhouette is 


normal in appearance. The great vessels are normal. No acute osseous 


abnormality.


 


Impression: 


Mild bilateral interstitial opacities are similar prior. No new 


consolidation. Unchanged pulmonary vascular fullness.


 []





Course & Med Decision Making:


Course & Med Decision Making


Pertinent Labs and Imaging studies reviewed. (See chart for details)





[] 2:40 PM: Patient remains stable. I discussed test results, the need for close

 follow-up, and return precautions.  Clinical suspicion for acute coronary 

syndrome is low, given the patient's low risk factor profile, prolonged duration

 of symptoms and reproducible nature of symptoms, associated with cough.  

Suspicion for COVID is also low but the patient will be tested per her request.





Dragon Disclaimer:


Dragon Disclaimer:


This electronic medical record was generated, in whole or in part, using a voice

 recognition dictation system.





Departure


Departure:


Impression:  


   Primary Impression:  


   Cough


   Additional Impression:  


   Atypical chest pain


Disposition:  01 HOME, SELF-CARE


Condition:  STABLE


Referrals:  


KELI MAR (PCP)


Patient Instructions:  Chest Pain (Nonspecific), Cough, Adult





Additional Instructions:  


Thank you for visiting [facility]. We appreciate you trusting us 

with your care. If any additional problems come up don't hesitate to return to 

visit us. Please follow up with your primary care provider so they can plan 

additional care if needed and know about the problem that you had. If symptoms 

worsen come back to the Emergency Department. Any concerning symptoms that start

 such as chest pain, shortness of air, weakness or numbness on one side of the 

body, running high fevers or any other concerning symptoms return to the ER.


You have a viral syndrome which may include symptoms like muscle aches, fevers, 

chills, runny nose, cough, sneezing, sore throat, vomiting, or diarrhea. One of 

the potential viruses that you may have is SARS-CoV-2, the virus that causes 

COVID-19, also known as the Coronavirus. You are just as likely to have a 

different viral infection such as the common cold, flu, etc. Most patients with 

the Coronavirus have mild symptoms and recover on their own. Resting, staying 

hydrated, and sleep from known cases can be helpful. As of todays visit, you 

are well enough to go home and treat your symptoms with oral fluids and over the

 counter medications. 


Coronavirus testing is not performed on most people with mild symptoms who are 

being discharged from the emergency department.


If Coronavirus testing was performed the results will not be available for 

possibly up to 2-3 days. If your result is positive you will be contacted. 


Please follow the following precautions at home:


1)   Stay home except to get medical care.


2)   As advised by the CDC we recommend you stay in your home and minimize 

contact with other people. We do not want you to spread the infection. 


3)   Those who are older or have significant medical issues may have more severe

 symptoms from this infection. We recommend self-isolation,FOR AT LEAST 7 DAYS 

after your 1st day of symptoms. AFTER you feel better please wait AT LEAST 

ANOTHER WEEK before returning to regular activities and being around other 

people!


4)   IF you become sicker and have difficulty breathing, chest pain, unable to 

eat/drink, severe vomiting, diarrhea, or weakness you may need to return to the 

Emergency Department.


5)   You should restrict activities outside your home, except for getting 

medical care. DO NOT go to work, school, or public areas. Avoid using public 

transportation, ride sharing, or taxis.


6)   Separate yourself from other people in your home. You should use a separate

 bathroom if possible.


7)   Avoid sharing personal household items such as dishes, cups, eating 

utensils, towels, etc.


8)   Clean all high touch surfaces every day (door knobs, counter tops, etc). 

Use a household cleaning spray or wipe per label instructions.


9)   Clean your hands often. Wash your hands with soap and water for at least 20

 seconds. 


10)   Cover your mouth and nose with a tissue when you cough or sneeze.


11)   Throw used tissues in a trash can and immediately wash your hands. 


For additional resources please visit the CDC website or the [facility] 

of Health ([phone]).











KRISTIN FISCHER MD           May 12, 2020 13:37

## 2020-05-13 NOTE — EKG
[facility] [address], [place] [address]

Test Date:    2020               Test Time:    13:25:08

Pat Name:     LASHONDA SANABRIA          Department:   

Patient ID:   [id number]           Room:          

Gender:       F                        Technician:   REKHA

:          1970               Requested By: KRISTIN FISCHER

Order Number: [id number]            Reading MD:   Rolan Garner

                                 Measurements

Intervals                              Axis          

Rate:         76                       P:            0

SC:           148                      QRS:          -5

QRSD:         90                       T:            93

QT:           380                                    

QTc:          427                                    

                           Interpretive Statements

SINUS RHYTHM

LEFTWARD AXIS

LOW LIMB LEAD VOLTAGE

T ABNORMALITY IN HIGH LATERAL LEADS

ABNORMAL ECG





Electronically Signed On 2020 8:26:20 CDT by Rolan Garner

## 2020-05-22 ENCOUNTER — HOSPITAL ENCOUNTER (EMERGENCY)
Dept: HOSPITAL 63 - ER | Age: 50
LOS: 1 days | Discharge: HOME | End: 2020-05-23
Payer: COMMERCIAL

## 2020-05-22 VITALS — HEIGHT: 62 IN | BODY MASS INDEX: 53.92 KG/M2 | WEIGHT: 293 LBS

## 2020-05-22 DIAGNOSIS — S81.011A: Primary | ICD-10-CM

## 2020-05-22 DIAGNOSIS — J45.909: ICD-10-CM

## 2020-05-22 DIAGNOSIS — I48.91: ICD-10-CM
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
## 2020-05-22 DIAGNOSIS — M19.90: ICD-10-CM

## 2020-05-22 DIAGNOSIS — E66.01: ICD-10-CM
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
## 2020-05-22 PROCEDURE — 73564 X-RAY EXAM KNEE 4 OR MORE: CPT

## 2020-05-22 PROCEDURE — 12004 RPR S/N/AX/GEN/TRK7.6-12.5CM: CPT

## 2020-05-22 PROCEDURE — 99283 EMERGENCY DEPT VISIT LOW MDM: CPT

## 2020-05-22 PROCEDURE — 90715 TDAP VACCINE 7 YRS/> IM: CPT

## 2020-05-22 PROCEDURE — 90471 IMMUNIZATION ADMIN: CPT

## 2020-05-23 VITALS — DIASTOLIC BLOOD PRESSURE: 86 MMHG | SYSTOLIC BLOOD PRESSURE: 139 MMHG

## 2020-05-23 NOTE — PHYS DOC
Past History


Past Medical History:  A-Fib, Arthritis, Asthma


Additional Past Medical Histor:  Morbid Obesity  BMI 50. 0 - 59.9,  Hx. Atypical

CP,  Chest wall pain,


Past Surgical History:  


Smoking:  Non-smoker


Alcohol Use:  None


Drug Use:  None





General Adult


EDM:


Chief Complaint:  LACERATION/AVULSION





HPI:


HPI:


"..We are in the process of moving to [place]... So we had a bunch of picture 

frames and I slipped on some water and hit my knee on the picture frame and 

broken glass... Bled  like crazy..."








Patient is a 50 year old female who presents with a 10 cm laceration of Rt. 

knee.   Currently has good hemostasis.  Laceration is gaping into the sub 

cutaneous area.  Patient does not remember her last tetanus but probably in 

excess of 10 years.  Patient is able to do straight leg lift.  Is able to do 

range of motion when it was irrigated.  Does have a significant avulses portion 

which appears to be dusky or avascular.  Distal neurovascular intact.  Patient 

denies any history of immunosuppression.  No history of recent travel outside 

the [facility].  She is in the process of moving to [place].  Pt. normally

follows with Star[facility].





Review of Systems:


Review of Systems:


Constitutional:  Denies fever or chills 


Eyes:  Denies change in visual acuity 


HENT:  Denies nasal congestion or sore throat 


Respiratory:  Denies cough or shortness of breath 


Cardiovascular:  Denies chest pain or edema 


GI:  Denies abdominal pain, nausea, vomiting, bloody stools or diarrhea 


:  Denies dysuria 


Musculoskeletal:  Denies back pain or joint pain 


Integument: Complaints of right knee laceration


Neurologic:  Denies headache, focal weakness or sensory changes 


Endocrine:  Denies polyuria or polydipsia 


Lymphatic:  Denies swollen glands 


Psychiatric:  Denies depression or anxiety





Heart Score:


Risk Factors:


Risk Factors:  DM, Current or recent (<one month) smoker, HTN, HLP, family 

history of CAD, obesity.


Risk Scores:


Score 0 - 3:  2.5% MACE over next 6 weeks - Discharge Home


Score 4 - 6:  20.3% MACE over next 6 weeks - Admit for Clinical Observation


Score 7 - 10:  72.7% MACE over next 6 weeks - Early Invasive Strategies





Family History:


Family History:


Noncontributory to presentation





Current Medications:


Current Meds:





Current Medications








 Medications


  (Trade)  Dose


 Ordered  Sig/Scar  Start Time


 Stop Time Status Last Admin


Dose Admin


 


 Diphtheria/


 Pertussis/Tetanus


 Vacc


  (ADACEL TDap


 SYRINGE)  0.5 ml  ONCE ONCE  20 00:15


 20 00:16 UNV 20 00:15


0.5 ML


 


 Lidocaine HCl  20 ml  1X  ONCE  20 00:30


 20 00:31  20 00:13


20 ML


 


 Tetanus/


 Diphtheria


 Toxoids Adsorbed


  (Tenivac Vial)  0.5 ml  ONCE ONCE  20 00:30


 20 00:15 DC  














Allergies:


Allergies:





Allergies








Coded Allergies Type Severity Reaction Last Updated Verified


 


  No Known Drug Allergies    19 No











Physical Exam:


PE:





Constitutional: Moderate acute distress, non-toxic appearance. []


HENT: Normocephalic, atraumatic, bilateral external ears normal, oropharynx 

moist, no oral exudates, nose normal. []


Eyes: PERRLA, EOMI, conjunctiva normal, no discharge. [] 


Neck: Normal range of motion, no tenderness, supple, no stridor. [] 


Cardiovascular:Heart rate regular rhythm, no murmur []


Lungs & Thorax:  Bilateral breath sounds clear to auscultation []


Abdomen: Bowel sounds normal, soft, no tenderness, no masses, no pulsatile 

masses.  Obese. Old surgery scar. 


Skin: Warm, dry, no erythema, no rash.  10 cm laceration right knee


Back: No tenderness, no CVA tenderness. [] 


Extremities: Right knee tenderness, no cyanosis, no clubbing, ROM intact, no 

edema. [] Right knee laceration as per HPI


Neurologic: Alert and oriented X 3, normal motor function, normal sensory 

function, no focal deficits noted. []


Psychologic: Affect normal, judgement normal, mood normal. []





Current Patient Data:


Vital Signs:





                                   Vital Signs








  Date Time  Temp Pulse Resp B/P (MAP) Pulse Ox O2 Delivery O2 Flow Rate FiO2


 


20 00:06 98.5 72 18 139/86 (103) 98 Room Air  











EKG:


EKG:


[]





Radiology/Procedures:


Radiology/Procedures:


[][address] [place] [id number] [phone]


                                        


                                 IMAGING REPORT





                                     Signed





PATIENT: LASHONDA SANABRIA LACCOUNT: [id number]     MRN#: [id number]


: 1970           LOCATION: ER              AGE: 50


SEX: F                    EXAM DT: 20         ACCESSION#: [id number]


STATUS: REG ER            ORD. PHYSICIAN: CARLEY PEOPLES MD


REASON: CUT RIGHT KNEE ON GLASS


PROCEDURE: KNEE BILAT 4V





4 view bilateral knee radiographs 2020


 


CLINICAL HISTORY: Cut knee on glass.


 


AP, lateral, oblique and sunrise portable digital radiographs of both 


knees were obtained. No fracture or dislocation of either knee is seen. No


radiopaque foreign body is noted. Irregularity of the anterior soft tissue


of the right knee is seen which likely reflects a laceration. Mild to 


moderate degenerative changes are seen involving the medial compartments 


of both knees, left greater than right. Mild to moderate enthesophyte 


formation is seen involving the anterior patella bilaterally. There is no 


radiographic evidence of a joint effusion.


 


IMPRESSION: No fracture or radiopaque foreign body is seen.


 


Electronically signed by: Bright Redd MD (2020 1:07 AM) [id number]














DICTATED AND SIGNED BY:     BRIGHT REDD MD


DATE:     207





CC: CARLEY PEOPLES MD; KELI MAR ~





Course & Med Decision Making:


Course & Med Decision Making


Pertinent Labs and Imaging studies reviewed. (See chart for details)








Laceration-laceration irrigated with normal saline under pressure and range of 

motion.  Betadine applied the edge of laceration. Lidocaine with epi to wound 

edges.  Irrigated laceration in range of motion with NS.  Laceration closed with

 subcutaneous 4-0 Vicryl  x8 internal, and 12 external.. Pt.  then 24 staples 

for final closure.  Dressing applied.  Ace wrap.  Patient keep laceration clean 

and dry.   One 1/2 of Staples out in 10 days.  Remainder of staples out at day 

15.  Patient apply Polysporin 4 times a day.  Ace wrap to wound area.  The 

patient return if any concerns.  Large avulsed flap may cause dehiscence because

 of flap is dusky.  Foreign body precautions given.  Scar may need revision.  

Patient warned wound is high risk for poor closure because of avulses portion of

 laceration appears to be avascular. Staples appear to be holding on ROM after 

closure. 





[]


Impression-





1. Avulsion flap- right knee laceration 10 cm


2. Chronic DJD bilateral Knee pain


3. Morbid Obesity





Dragon Disclaimer:


Dragon Disclaimer:


This electronic medical record was generated, in whole or in part, using a voice

 recognition dictation system.





Departure


Departure:


Disposition:  01 HOME/RESIDENCE PRIOR TO ADM


Condition:  STABLE


Referrals:  


KELI MAR (PCP)


Scripts


Hydrocodone/Ibuprofen (HYDROCODONE-IBUPROFEN 7.5-200  **) 1 Each Tablet


1 TAB PO PRN Q6HRS PRN for PAIN, #30 TAB 0 Refills


   Prov: CARLEY PEOPLES MD         20





Dragon Disclaimer


This chart was dictated in whole or in part using Voice Recognition software in 

a busy, high-work load, and often noisy Emergency Department environment.  It 

may contain unintended and wholly unrecognized errors or omissions.





Dragon Disclaimer


This chart was dictated in whole or in part using Voice Recognition software in 

a busy, high-work load, and often noisy Emergency Department environment.  It 

may contain unintended and wholly unrecognized errors or omissions.











CARLEY PEOPLES MD           May 23, 2020 00:24

## 2020-05-23 NOTE — RAD
4 view bilateral knee radiographs 5/23/2020

 

CLINICAL HISTORY: Cut knee on glass.

 

AP, lateral, oblique and sunrise portable digital radiographs of both 

knees were obtained. No fracture or dislocation of either knee is seen. No

radiopaque foreign body is noted. Irregularity of the anterior soft tissue

of the right knee is seen which likely reflects a laceration. Mild to 

moderate degenerative changes are seen involving the medial compartments 

of both knees, left greater than right. Mild to moderate enthesophyte 

formation is seen involving the anterior patella bilaterally. There is no 

radiographic evidence of a joint effusion.

 

IMPRESSION: No fracture or radiopaque foreign body is seen.

 

Electronically signed by: Bright Redd MD (5/23/2020 1:07 AM) [id number]

## 2020-05-29 ENCOUNTER — HOSPITAL ENCOUNTER (EMERGENCY)
Dept: HOSPITAL 63 - ER | Age: 50
Discharge: HOME | End: 2020-05-29
Payer: COMMERCIAL

## 2020-05-29 VITALS — SYSTOLIC BLOOD PRESSURE: 136 MMHG | DIASTOLIC BLOOD PRESSURE: 90 MMHG

## 2020-05-29 VITALS — HEIGHT: 62 IN | WEIGHT: 293 LBS | BODY MASS INDEX: 53.92 KG/M2

## 2020-05-29 DIAGNOSIS — M19.90: ICD-10-CM

## 2020-05-29 DIAGNOSIS — I48.91: ICD-10-CM

## 2020-05-29 DIAGNOSIS — J45.909: ICD-10-CM

## 2020-05-29 DIAGNOSIS — E66.01: ICD-10-CM

## 2020-05-29 DIAGNOSIS — L03.115: Primary | ICD-10-CM

## 2020-05-29 PROCEDURE — 99283 EMERGENCY DEPT VISIT LOW MDM: CPT

## 2020-05-29 NOTE — PHYS DOC
Past History


Past Medical History:  A-Fib, Arthritis, Asthma


Additional Past Medical Histor:  Morbid Obesity  BMI 50. 0 - 59.9,  Hx. Atypical

CP,  Chest wall pain,


Past Surgical History:  


Smoking:  Non-smoker


Alcohol Use:  None


Drug Use:  None





General Adult


EDM:


Chief Complaint:  WOUND CHECK





HPI:


HPI:


50-year-old female presents with concern about cellulitis.  She had a laceration

of the right knee area about 7 days ago.  Over the last 3 days, it is gotten 

more erythematous and warm to the touch.  She has had some thin drainage.  They 

told her if it looked like that she should come in to the hospital for 

evaluation of infection.  She denies fever chills.  She has no other concerns or

complaints at this time.





Review of Systems:


Review of Systems:


Constitutional:  Denies fever or chills 


Eyes:  Denies change in visual acuity 


HENT:  Denies nasal congestion or sore throat 


Respiratory:  Denies cough or shortness of breath 


Cardiovascular:  Denies chest pain or edema 


GI:  Denies abdominal pain, nausea, vomiting, bloody stools or diarrhea 


:  Denies dysuria 


Musculoskeletal:  Denies back pain or joint pain 


Integument: Cellulitis right knee


Neurologic:  Denies headache, focal weakness or sensory changes 


Endocrine:  Denies polyuria or polydipsia 


Lymphatic:  Denies swollen glands 


Psychiatric:  Denies depression or anxiety





Heart Score:


Risk Factors:


Risk Factors:  DM, Current or recent (<one month) smoker, HTN, HLP, family 

history of CAD, obesity.


Risk Scores:


Score 0 - 3:  2.5% MACE over next 6 weeks - Discharge Home


Score 4 - 6:  20.3% MACE over next 6 weeks - Admit for Clinical Observation


Score 7 - 10:  72.7% MACE over next 6 weeks - Early Invasive Strategies





Allergies:


Allergies:





Allergies








Coded Allergies Type Severity Reaction Last Updated Verified


 


  No Known Drug Allergies    19 No











Physical Exam:


PE:





Constitutional: Well developed, well nourished, morbidly obese, no acute 

distress, non-toxic appearance. []


HENT: Normocephalic, atraumatic, bilateral external ears normal, oropharynx 

moist, no oral exudates, nose normal. []


Eyes: PERRLA, EOMI, conjunctiva normal, no discharge. [] 


Neck: Normal range of motion, no tenderness, supple, no stridor. [] 


Cardiovascular:Heart rate regular rhythm, no murmur []


Lungs & Thorax:  Bilateral breath sounds clear to auscultation []


Abdomen: Bowel sounds normal, soft, no tenderness, no masses, no pulsatile 

masses. [] 


Skin: V-shaped wound with sutures and staples in the right lateral knee, 4 cm x 

4 cm erythematous, warm area of skin without obvious abscess [] 


Back: No tenderness, no CVA tenderness. [] 


Extremities: No tenderness, no cyanosis, no clubbing, ROM intact, no edema. [] 


Neurologic: Alert and oriented X 3, normal motor function, normal sensory 

function, no focal deficits noted. []


Psychologic: Affect normal, judgement normal, mood normal. []





Current Patient Data:


Vital Signs:





                                   Vital Signs








  Date Time  Temp Pulse Resp B/P (MAP) Pulse Ox O2 Delivery O2 Flow Rate FiO2


 


20 08:53 98.6 87 22 136/90 (105) 98 Room Air  











EKG:


EKG:


[]





Radiology/Procedures:


Radiology/Procedures:


[]





Course & Med Decision Making:


Course & Med Decision Making


Pertinent Labs and Imaging studies reviewed. (See chart for details)


The patient was placed on Cipro.  The wound does appear to have superficial 

cellulitis infection.  I will switch her to Keflex 500 mg 3 times daily.  She is

 stable for discharge at this time.


[]





Dragon Disclaimer:


Dragon Disclaimer:


This electronic medical record was generated, in whole or in part, using a voice

 recognition dictation system.





Departure


Departure:


Impression:  


   Primary Impression:  


   Cellulitis of right knee


Disposition:   HOME/RESIDENCE PRIOR TO ADM


Condition:  STABLE


Referrals:  


KELI MAR (PCP)


Patient Instructions:  Cellulitis, Easy-to-Read


Scripts


Cephalexin (KEFLEX) 500 Mg Capsule


1 CAP PO TID for cellulitis for 7 Days, #21 CAP 0 Refills


   Prov: ESTHELA BARRETT DO         20











ESTHELA BARRETT DO                 May 29, 2020 09:09

## 2020-06-02 ENCOUNTER — HOSPITAL ENCOUNTER (EMERGENCY)
Dept: HOSPITAL 63 - ER | Age: 50
Discharge: HOME | End: 2020-06-02
Payer: COMMERCIAL

## 2020-06-02 VITALS — WEIGHT: 293 LBS | HEIGHT: 62 IN | BODY MASS INDEX: 53.92 KG/M2

## 2020-06-02 VITALS
SYSTOLIC BLOOD PRESSURE: 164 MMHG | SYSTOLIC BLOOD PRESSURE: 164 MMHG | DIASTOLIC BLOOD PRESSURE: 87 MMHG | DIASTOLIC BLOOD PRESSURE: 87 MMHG | SYSTOLIC BLOOD PRESSURE: 164 MMHG | DIASTOLIC BLOOD PRESSURE: 87 MMHG

## 2020-06-02 DIAGNOSIS — J45.909: ICD-10-CM

## 2020-06-02 DIAGNOSIS — S81.811D: Primary | ICD-10-CM

## 2020-06-02 DIAGNOSIS — E66.01: ICD-10-CM
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
## 2020-06-02 DIAGNOSIS — M19.90: ICD-10-CM

## 2020-06-02 DIAGNOSIS — I48.91: ICD-10-CM

## 2020-06-02 PROCEDURE — 99281 EMR DPT VST MAYX REQ PHY/QHP: CPT

## 2020-06-02 NOTE — PHYS DOC
Past History


Past Medical History:  A-Fib, Arthritis, Asthma


Additional Past Medical Histor:  Morbid Obesity  BMI 50. 0 - 59.9,  Hx. Atypical

CP,  Chest wall pain,


Past Surgical History:  


Smoking:  Non-smoker


Alcohol Use:  Rarely


Drug Use:  None





General Adult


EDM:


Chief Complaint:  SUTURE/STAPLE REMOVAL





HPI:


HPI:





Patient is a 50-year-old female who presents approximately 2 weeks after having 

a wound repaired on her right leg.  In the middle of the healing process she 

developed an infection and was started on antibiotics.  She is here today to 

remove the sutures.  She still has 5 days of antibiotics left.  She denies any 

fever chills or sweats.  She is not had any drainage from the wound.  []





Review of Systems:


Review of Systems:


Constitutional:  Denies fever or chills 


Eyes:  Denies change in visual acuity 


HENT:  Denies nasal congestion or sore throat 


Respiratory:  Denies cough or shortness of breath 


Cardiovascular:  Denies chest pain or edema 


GI:  Denies abdominal pain, nausea, vomiting, bloody stools or diarrhea 


:  Denies dysuria 


Musculoskeletal:  Denies back pain or joint pain 


Integument: Per HPI


Neurologic:  Denies headache, focal weakness or sensory changes 


Endocrine:  Denies polyuria or polydipsia 


Lymphatic:  Denies swollen glands 


Psychiatric:  Denies depression or anxiety





Heart Score:


Risk Factors:


Risk Factors:  DM, Current or recent (<one month) smoker, HTN, HLP, family 

history of CAD, obesity.


Risk Scores:


Score 0 - 3:  2.5% MACE over next 6 weeks - Discharge Home


Score 4 - 6:  20.3% MACE over next 6 weeks - Admit for Clinical Observation


Score 7 - 10:  72.7% MACE over next 6 weeks - Early Invasive Strategies





Allergies:


Allergies:





Allergies








Coded Allergies Type Severity Reaction Last Updated Verified


 


  No Known Drug Allergies    19 No











Physical Exam:


PE:





Constitutional: Well developed, well nourished, no acute distress, non-toxic 

appearance. []


HENT: Normocephalic, atraumatic, bilateral external ears normal, oropharynx 

moist, no oral exudates, nose normal. []


Eyes: PERRLA, EOMI, conjunctiva normal, no discharge. [] 


Neck: Normal range of motion, no tenderness, supple, no stridor. [] 


Cardiovascular:Heart rate regular rhythm, no murmur []


Lungs & Thorax:  Bilateral breath sounds clear to auscultation []


Abdomen: Bowel sounds normal, soft, no tenderness, no masses, no pulsatile 

masses. [] 


Skin: Warm, dry, no erythema, no rash. [] 


Back: No tenderness, no CVA tenderness. [] 


Extremities: No tenderness, no cyanosis, no clubbing, ROM intact, no edema. [] 


Neurologic: Alert and oriented X 3, normal motor function, normal sensory 

function, no focal deficits noted. []


Psychologic: Affect normal, judgement normal, mood normal. []





Current Patient Data:


Vital Signs:





                                   Vital Signs








  Date Time  Temp Pulse Resp B/P (MAP) Pulse Ox O2 Delivery O2 Flow Rate FiO2


 


20 11:04 98.6 90 18 164/87 (112) 96 Room Air  











EKG:


EKG:


[]





Radiology/Procedures:


Radiology/Procedures:


[]





Course & Med Decision Making:


Course & Med Decision Making


Pertinent Labs and Imaging studies reviewed. (See chart for details)





[Suture removal: We removed 26 staples and 6 sutures from the wound there is sti

ll some surrounding erythema but no evidence of abscess otherwise healing well I

 informed the patient that she needs to continue her antibiotics.]





Dragon Disclaimer:


Dragon Disclaimer:


This electronic medical record was generated, in whole or in part, using a voice

recognition dictation system.





Departure


Departure:


Impression:  


   Primary Impression:  


   Encounter for removal of sutures


Disposition:  01 HOME/RESIDENCE PRIOR TO ADM


Condition:  STABLE


Referrals:  


KELI MAR (PCP)


Patient Instructions:  Suture Removal





Additional Instructions:  


Continue antibiotics as prescribed.  Return to the emergency department with any

new or concerning symptoms











BRIDGET JURADO DO              2020 11:26

## 2020-09-01 ENCOUNTER — HOSPITAL ENCOUNTER (OUTPATIENT)
Dept: HOSPITAL 63 - RAD | Age: 50
Discharge: HOME | End: 2020-09-01
Attending: PHYSICIAN ASSISTANT
Payer: COMMERCIAL

## 2020-09-01 DIAGNOSIS — M47.816: ICD-10-CM

## 2020-09-01 DIAGNOSIS — M25.752: Primary | ICD-10-CM

## 2020-09-01 PROCEDURE — 73502 X-RAY EXAM HIP UNI 2-3 VIEWS: CPT

## 2020-09-01 NOTE — RAD
EXAMINATION: HIP LEFT 2V WITH PELVIS  

 

CLINICAL HISTORY: Left hip pain

 

TECHNIQUE: HIP LEFT 2V WITH PELVIS

   Number of different views (projections): 3

 

COMPARISON: 3/26/2020

 

FINDINGS:  

 

Joint spaces and alignment of the left hip relatively well-maintained with

minimal marginal osteophytes. Similar findings noted in the right hip on 

limited evaluation. Pubic symphysis and SI joints maintained. Partially 

visualized lumbar degenerative changes. No acute fracture.

 

 

IMPRESSION:  

 

No acute osseous abnormality.

 

 

Electronically signed by: Jayden Crowe DO (9/1/2020 4:24 PM) [id number]

## 2020-09-23 ENCOUNTER — HOSPITAL ENCOUNTER (OUTPATIENT)
Dept: HOSPITAL 63 - LAB | Age: 50
Discharge: HOME | End: 2020-09-23
Payer: COMMERCIAL

## 2020-09-23 DIAGNOSIS — I48.0: Primary | ICD-10-CM

## 2020-09-23 LAB
ALBUMIN SERPL-MCNC: 3.4 G/DL (ref 3.4–5)
ALBUMIN/GLOB SERPL: 0.8 {RATIO} (ref 1–1.7)
ALP SERPL-CCNC: 128 U/L (ref 46–116)
ALT SERPL-CCNC: 24 U/L (ref 14–59)
ANION GAP SERPL CALC-SCNC: 11 MMOL/L (ref 6–14)
AST SERPL-CCNC: 10 U/L (ref 15–37)
BILIRUB SERPL-MCNC: 0.4 MG/DL (ref 0.2–1)
BUN/CREAT SERPL: 20 (ref 6–20)
CA-I SERPL ISE-MCNC: 20 MG/DL (ref 7–20)
CALCIUM SERPL-MCNC: 8.9 MG/DL (ref 8.5–10.1)
CHLORIDE SERPL-SCNC: 103 MMOL/L (ref 98–107)
CO2 SERPL-SCNC: 25 MMOL/L (ref 21–32)
CREAT SERPL-MCNC: 1 MG/DL (ref 0.6–1)
GFR SERPLBLD BASED ON 1.73 SQ M-ARVRAT: 58.9 ML/MIN
GLOBULIN SER-MCNC: 4.1 G/DL (ref 2.2–3.8)
GLUCOSE SERPL-MCNC: 106 MG/DL (ref 70–99)
POTASSIUM SERPL-SCNC: 3.9 MMOL/L (ref 3.5–5.1)
PROT SERPL-MCNC: 7.5 G/DL (ref 6.4–8.2)
SODIUM SERPL-SCNC: 139 MMOL/L (ref 136–145)

## 2020-09-23 PROCEDURE — 36415 COLL VENOUS BLD VENIPUNCTURE: CPT

## 2020-09-23 PROCEDURE — 80053 COMPREHEN METABOLIC PANEL: CPT
